# Patient Record
Sex: FEMALE | Race: WHITE | NOT HISPANIC OR LATINO | URBAN - NONMETROPOLITAN AREA
[De-identification: names, ages, dates, MRNs, and addresses within clinical notes are randomized per-mention and may not be internally consistent; named-entity substitution may affect disease eponyms.]

---

## 2022-08-25 ENCOUNTER — OFFICE VISIT (OUTPATIENT)
Dept: FAMILY MEDICINE CLINIC | Age: 52
End: 2022-08-25

## 2022-08-25 VITALS
RESPIRATION RATE: 16 BRPM | BODY MASS INDEX: 27.93 KG/M2 | HEART RATE: 66 BPM | WEIGHT: 163.6 LBS | HEIGHT: 64 IN | SYSTOLIC BLOOD PRESSURE: 122 MMHG | DIASTOLIC BLOOD PRESSURE: 66 MMHG | OXYGEN SATURATION: 98 % | TEMPERATURE: 98 F

## 2022-08-25 DIAGNOSIS — F41.1 GAD (GENERALIZED ANXIETY DISORDER): Primary | ICD-10-CM

## 2022-08-25 DIAGNOSIS — G47.09 OTHER INSOMNIA: ICD-10-CM

## 2022-08-25 DIAGNOSIS — F33.1 MAJOR DEPRESSIVE DISORDER, RECURRENT, MODERATE: ICD-10-CM

## 2022-08-25 PROCEDURE — 99214 OFFICE O/P EST MOD 30 MIN: CPT | Performed by: FAMILY MEDICINE

## 2022-08-25 RX ORDER — BENZONATATE 100 MG/1
100 CAPSULE ORAL 3 TIMES DAILY PRN
COMMUNITY

## 2022-08-25 RX ORDER — ALBUTEROL SULFATE 2.5 MG/3ML
2.5 SOLUTION RESPIRATORY (INHALATION) 3 TIMES DAILY PRN
COMMUNITY
End: 2022-09-22 | Stop reason: SDUPTHER

## 2022-08-25 RX ORDER — TRAZODONE HYDROCHLORIDE 50 MG/1
50 TABLET ORAL NIGHTLY
Qty: 30 TABLET | Refills: 0 | Status: SHIPPED | OUTPATIENT
Start: 2022-08-25 | End: 2022-09-22 | Stop reason: SDUPTHER

## 2022-08-25 RX ORDER — FLUOXETINE HYDROCHLORIDE 20 MG/1
20 CAPSULE ORAL DAILY
Qty: 90 CAPSULE | Refills: 0 | Status: SHIPPED | OUTPATIENT
Start: 2022-08-25 | End: 2022-09-22 | Stop reason: SDUPTHER

## 2022-08-25 RX ORDER — FLUOXETINE HYDROCHLORIDE 20 MG/1
1 CAPSULE ORAL DAILY
COMMUNITY
Start: 2022-06-10 | End: 2022-08-25 | Stop reason: SDUPTHER

## 2022-08-25 RX ORDER — MONTELUKAST SODIUM 10 MG/1
1 TABLET ORAL EVERY EVENING
COMMUNITY
Start: 2022-05-20 | End: 2022-09-22 | Stop reason: SDUPTHER

## 2022-08-25 RX ORDER — BUDESONIDE 0.5 MG/2ML
0.5 INHALANT ORAL DAILY PRN
COMMUNITY

## 2022-08-25 NOTE — PROGRESS NOTES
Teresa Martinez presents to [facility]      Chief Complaint  Follow up Anxiety, Depression    HPI   No side effects from Fluoxetine.  No panic attacks.  Not feeling anxious.  Feeling sad, quite teary and a little depressed.  No thoughts to hurt self.  Irritable.  No mood swings. No hallucinations.  Sleeping poorly. Difficult to obtain and maintain sleep  Appetite is good.  Socializing.      Current Outpatient Medications:   •  albuterol (PROVENTIL) (2.5 MG/3ML) 0.083% nebulizer solution, Take 2.5 mg by nebulization 3 (Three) Times a Day As Needed for Wheezing or Shortness of Air., Disp: , Rfl:   •  Beclomethasone Diprop HFA (QVAR Redihaler) 80 MCG/ACT inhaler, Inhale 1 puff 2 (Two) Times a Day., Disp: , Rfl:   •  benzonatate (TESSALON) 100 MG capsule, Take 100 mg by mouth 3 (Three) Times a Day As Needed for Cough., Disp: , Rfl:   •  budesonide (PULMICORT) 0.5 MG/2ML nebulizer solution, Take 0.5 mg by nebulization Daily As Needed (Shortness of Air; Wheezing)., Disp: , Rfl:   •  FLUoxetine (PROzac) 20 MG capsule, Take 1 capsule by mouth Daily., Disp: 90 capsule, Rfl: 0  •  montelukast (SINGULAIR) 10 MG tablet, Take 1 tablet by mouth Every Evening., Disp: , Rfl:   •  traZODone (DESYREL) 50 MG tablet, Take 1 tablet by mouth Every Night. Start with 1/2 tablet at bedtime.  If insomnia persists, after 3 days, may increase to 1 full tablet at bedtime, Disp: 30 tablet, Rfl: 0     Allergies   Allergen Reactions   • Buspirone Other (See Comments)     Not effective    • Codeine Other (See Comments)     Hyperactivity   • Prozac [Fluoxetine] Other (See Comments)     Depression if > 10mg   • Wellbutrin [Bupropion] Other (See Comments)     Depression    • Penicillins Rash        History reviewed. No pertinent past medical history.    History reviewed. No pertinent surgical history.     History reviewed. No pertinent family history.     Social History     Socioeconomic History   • Marital status: Unknown  "       ROS: As in HPI      Objective   Vital Signs:  /66 (BP Location: Left arm, Patient Position: Sitting, Cuff Size: Adult)   Pulse 66   Temp 98 °F (36.7 °C) (Infrared)   Resp 16   Ht 162.6 cm (64\")   Wt 74.2 kg (163 lb 9.6 oz)   SpO2 98%   BMI 28.08 kg/m²   Estimated body mass index is 28.08 kg/m² as calculated from the following:    Height as of this encounter: 162.6 cm (64\").    Weight as of this encounter: 74.2 kg (163 lb 9.6 oz).        Physical Exam  Constitutional:       Appearance: She is well-groomed.   Cardiovascular:      Rate and Rhythm: Normal rate and regular rhythm.   Pulmonary:      Comments: Clear to auscultation. Excellent air movement throughout  Skin:     General: Skin is warm and dry.      Coloration: Skin is not pale.      Findings: No erythema.   Neurological:      Comments: Within normal limits   Psychiatric:         Attention and Perception: Attention normal.         Mood and Affect: Mood is anxious and depressed.         Speech: Speech normal. Speech is not rapid and pressured.         Behavior: Behavior is cooperative.         Thought Content: Thought content normal.         Judgment: Judgment normal.      Comments: Pleasant. Fidgeting.  Excellent eye contact.          Result Review :                    Assessment and Plan   Diagnoses and all orders for this visit:    1. GERMAN (generalized anxiety disorder) (Primary)  -     FLUoxetine (PROzac) 20 MG capsule; Take 1 capsule by mouth Daily.  Dispense: 90 capsule; Refill: 0    2. Major depressive disorder, recurrent, moderate (HCC)  Assessment & Plan:  MDM:  Patient's depression is not in full control.  There has been an increase from last visit.  Insomnia is also an issue.  Will start Trazodone.  Medication discussed with patient.  She will take Fluoxetine in the morning as it did not help with sleep when she took it in the evening    Orders:  -     traZODone (DESYREL) 50 MG tablet; Take 1 tablet by mouth Every Night. Start with " 1/2 tablet at bedtime.  If insomnia persists, after 3 days, may increase to 1 full tablet at bedtime  Dispense: 30 tablet; Refill: 0    3. Other insomnia  -     traZODone (DESYREL) 50 MG tablet; Take 1 tablet by mouth Every Night. Start with 1/2 tablet at bedtime.  If insomnia persists, after 3 days, may increase to 1 full tablet at bedtime  Dispense: 30 tablet; Refill: 0           Follow Up   Return in about 3 weeks (around 9/15/2022) for F/U Anxiety/Depression.    Patient was given instructions and counseling regarding her condition or for health maintenance advice. Please see specific information pulled into the AVS if appropriate.   There are no Patient Instructions on file for this visit.

## 2022-08-25 NOTE — ASSESSMENT & PLAN NOTE
MDM:  Patient's depression is not in full control.  There has been an increase from last visit.  Insomnia is also an issue.  Will start Trazodone.  Medication discussed with patient.  She will take Fluoxetine in the morning as it did not help with sleep when she took it in the evening

## 2022-09-15 NOTE — PROGRESS NOTES
"Teresa Martinez presents to [facility]      Chief Complaint  Follow up Depression, Insomnia    HPI   \"I'm feeling much better.\"  No side effects from addition of Trazodone for inadequate control of depression and insomnia. Taking 1/2 tab at bedtime. Taking Fluoxetine in the mornings.  No panic attacks.  Less feeling of anxiety.  Anxiety form stress at work.  A lot of tasks to be done.  Not feeling sad, teary or depressed.  No thoughts to hurt self.  Not irritable.  No mood swings. No hallucinations.  Sleeping well now and waking refreshed.  Goes to bed at 11 PM, up at 7 AM.  Appetite is good.      COPD  Quit tobacco smoking.  History of COPD exacerabations throughout the fall and winter months.  Does well from April to October. \"Starting to feel mucous piling up in my lungs.\"    Current Outpatient Medications:   •  albuterol (PROVENTIL) (2.5 MG/3ML) 0.083% nebulizer solution, Take 2.5 mg by nebulization 3 (Three) Times a Day As Needed for Wheezing or Shortness of Air., Disp: 360 mL, Rfl: 1  •  Beclomethasone Diprop HFA (QVAR Redihaler) 80 MCG/ACT inhaler, Inhale 1 puff 2 (Two) Times a Day., Disp: , Rfl:   •  benzonatate (TESSALON) 100 MG capsule, Take 100 mg by mouth 3 (Three) Times a Day As Needed for Cough., Disp: , Rfl:   •  budesonide (PULMICORT) 0.5 MG/2ML nebulizer solution, Take 0.5 mg by nebulization Daily As Needed (Shortness of Air; Wheezing)., Disp: , Rfl:   •  FLUoxetine (PROzac) 20 MG capsule, Take 1 capsule by mouth Daily., Disp: 90 capsule, Rfl: 0  •  montelukast (SINGULAIR) 10 MG tablet, Take 1 tablet by mouth Every Evening., Disp: 90 tablet, Rfl: 1  •  traZODone (DESYREL) 50 MG tablet, Take 0.5 tablets by mouth Every Night., Disp: 45 tablet, Rfl: 0     Allergies   Allergen Reactions   • Buspirone Other (See Comments)     Not effective    • Codeine Other (See Comments)     Hyperactivity   • Prozac [Fluoxetine] Other (See Comments)     Depression if > 10mg   • Wellbutrin " "[Bupropion] Other (See Comments)     Depression    • Penicillins Rash        History reviewed. No pertinent past medical history.    History reviewed. No pertinent surgical history.     History reviewed. No pertinent family history.     Social History     Socioeconomic History   • Marital status: Unknown   Vaping Use   • Vaping Use: Never used   Substance and Sexual Activity   • Drug use: Never   • Sexual activity: Defer        ROS: As in HPI      Objective   Vital Signs:  /64 (BP Location: Left arm, Patient Position: Sitting, Cuff Size: Adult)   Pulse 67   Temp 98.2 °F (36.8 °C) (Infrared)   Resp 16   Ht 162.6 cm (64\")   Wt 74.8 kg (164 lb 12.8 oz)   SpO2 97%   BMI 28.29 kg/m²   Estimated body mass index is 28.29 kg/m² as calculated from the following:    Height as of this encounter: 162.6 cm (64\").    Weight as of this encounter: 74.8 kg (164 lb 12.8 oz).        Physical Exam  Constitutional:       Appearance: She is well-groomed.   Cardiovascular:      Rate and Rhythm: Normal rate and regular rhythm.   Pulmonary:      Comments: Clear to auscultation. Excellent air movement throughout  Skin:     General: Skin is warm and dry.      Coloration: Skin is not pale.      Findings: No erythema.   Neurological:      Comments: Within normal limits   Psychiatric:         Attention and Perception: Attention normal.         Mood and Affect: Mood and affect normal.         Speech: Speech normal. Speech is not rapid and pressured.         Behavior: Behavior is cooperative.         Thought Content: Thought content normal.         Judgment: Judgment normal.      Comments: Pleasant, relaxed.  Excellent eye contact         Result Review :                    Assessment and Plan   Diagnoses and all orders for this visit:    1. GERMAN (generalized anxiety disorder) (Primary)  Assessment & Plan:  MDM: Psychological condition well controlled. No change in medication    Orders:  -     FLUoxetine (PROzac) 20 MG capsule; Take 1 " capsule by mouth Daily.  Dispense: 90 capsule; Refill: 0    2. Obstructive chronic bronchitis without exacerbation (HCC)  Assessment & Plan:  MDM: Seasonal exacerbations of COPD.  Start preventive maintenance inhaler    Orders:  -     albuterol (PROVENTIL) (2.5 MG/3ML) 0.083% nebulizer solution; Take 2.5 mg by nebulization 3 (Three) Times a Day As Needed for Wheezing or Shortness of Air.  Dispense: 360 mL; Refill: 1    3. Other insomnia  -     traZODone (DESYREL) 50 MG tablet; Take 0.5 tablets by mouth Every Night.  Dispense: 45 tablet; Refill: 0    4. Non-seasonal allergic rhinitis due to pollen  Assessment & Plan:  MDM: Controlled with Singular.  Continue medication    Orders:  -     montelukast (SINGULAIR) 10 MG tablet; Take 1 tablet by mouth Every Evening.  Dispense: 90 tablet; Refill: 1    5. Major depressive disorder, recurrent, moderate (HCC)  Assessment & Plan:  MDM: Patient's depression and insomnia now controlled.  Continue 25 mg Trazodone at bedtime    Orders:  -     traZODone (DESYREL) 50 MG tablet; Take 0.5 tablets by mouth Every Night.  Dispense: 45 tablet; Refill: 0           Follow Up   Return in about 3 months (around 12/22/2022) for Depression/Anxiety, Insomnia.    Patient was given instructions and counseling regarding her condition or for health maintenance advice. Please see specific information pulled into the AVS if appropriate.   There are no Patient Instructions on file for this visit.

## 2022-09-22 ENCOUNTER — OFFICE VISIT (OUTPATIENT)
Dept: FAMILY MEDICINE CLINIC | Age: 52
End: 2022-09-22

## 2022-09-22 VITALS
OXYGEN SATURATION: 97 % | BODY MASS INDEX: 28.13 KG/M2 | DIASTOLIC BLOOD PRESSURE: 64 MMHG | HEART RATE: 67 BPM | RESPIRATION RATE: 16 BRPM | WEIGHT: 164.8 LBS | SYSTOLIC BLOOD PRESSURE: 112 MMHG | HEIGHT: 64 IN | TEMPERATURE: 98.2 F

## 2022-09-22 DIAGNOSIS — J30.1 NON-SEASONAL ALLERGIC RHINITIS DUE TO POLLEN: ICD-10-CM

## 2022-09-22 DIAGNOSIS — G47.09 OTHER INSOMNIA: ICD-10-CM

## 2022-09-22 DIAGNOSIS — F41.1 GAD (GENERALIZED ANXIETY DISORDER): Primary | ICD-10-CM

## 2022-09-22 DIAGNOSIS — F33.1 MAJOR DEPRESSIVE DISORDER, RECURRENT, MODERATE: ICD-10-CM

## 2022-09-22 DIAGNOSIS — Z12.31 SCREENING MAMMOGRAM FOR BREAST CANCER: ICD-10-CM

## 2022-09-22 DIAGNOSIS — J44.9 OBSTRUCTIVE CHRONIC BRONCHITIS WITHOUT EXACERBATION: ICD-10-CM

## 2022-09-22 PROBLEM — J44.89 OBSTRUCTIVE CHRONIC BRONCHITIS WITHOUT EXACERBATION: Status: ACTIVE | Noted: 2022-09-22

## 2022-09-22 PROCEDURE — 99214 OFFICE O/P EST MOD 30 MIN: CPT | Performed by: FAMILY MEDICINE

## 2022-09-22 RX ORDER — BUDESONIDE 0.5 MG/2ML
0.5 INHALANT ORAL DAILY PRN
Qty: 120 ML | Refills: 1 | Status: CANCELLED | OUTPATIENT
Start: 2022-09-22

## 2022-09-22 RX ORDER — TRAZODONE HYDROCHLORIDE 50 MG/1
25 TABLET ORAL NIGHTLY
Qty: 45 TABLET | Refills: 0 | Status: SHIPPED | OUTPATIENT
Start: 2022-09-22 | End: 2022-12-08 | Stop reason: SDUPTHER

## 2022-09-22 RX ORDER — FLUOXETINE HYDROCHLORIDE 20 MG/1
20 CAPSULE ORAL DAILY
Qty: 90 CAPSULE | Refills: 0 | Status: SHIPPED | OUTPATIENT
Start: 2022-09-22

## 2022-09-22 RX ORDER — ALBUTEROL SULFATE 2.5 MG/3ML
2.5 SOLUTION RESPIRATORY (INHALATION) 3 TIMES DAILY PRN
Qty: 360 ML | Refills: 1 | Status: SHIPPED | OUTPATIENT
Start: 2022-09-22

## 2022-09-22 RX ORDER — MONTELUKAST SODIUM 10 MG/1
10 TABLET ORAL EVERY EVENING
Qty: 90 TABLET | Refills: 1 | Status: SHIPPED | OUTPATIENT
Start: 2022-09-22

## 2022-12-08 DIAGNOSIS — G47.09 OTHER INSOMNIA: ICD-10-CM

## 2022-12-08 DIAGNOSIS — F33.1 MAJOR DEPRESSIVE DISORDER, RECURRENT, MODERATE: ICD-10-CM

## 2022-12-08 RX ORDER — TRAZODONE HYDROCHLORIDE 50 MG/1
25 TABLET ORAL NIGHTLY
Qty: 45 TABLET | Refills: 0 | Status: SHIPPED | OUTPATIENT
Start: 2022-12-08

## 2022-12-08 NOTE — TELEPHONE ENCOUNTER
Rx Refill Note      Requested Prescriptions     Pending Prescriptions Disp Refills   • traZODone (DESYREL) 50 MG tablet 45 tablet 0     Sig: Take 0.5 tablets by mouth Every Night.      Last office visit with prescribing clinician: 9/22/2022      Next office visit with prescribing clinician: 12/22/2022            Chante Spence MA  12/08/22, 14:26 EST

## 2022-12-19 DIAGNOSIS — Z12.31 SCREENING MAMMOGRAM FOR BREAST CANCER: ICD-10-CM

## 2023-11-15 ENCOUNTER — OFFICE VISIT (OUTPATIENT)
Dept: FAMILY MEDICINE CLINIC | Facility: CLINIC | Age: 53
End: 2023-11-15
Payer: MEDICAID

## 2023-11-15 VITALS
OXYGEN SATURATION: 96 % | TEMPERATURE: 97.7 F | SYSTOLIC BLOOD PRESSURE: 143 MMHG | RESPIRATION RATE: 17 BRPM | BODY MASS INDEX: 29.67 KG/M2 | HEART RATE: 79 BPM | HEIGHT: 64 IN | DIASTOLIC BLOOD PRESSURE: 90 MMHG | WEIGHT: 173.8 LBS

## 2023-11-15 DIAGNOSIS — J44.1 COPD WITH EXACERBATION: Primary | ICD-10-CM

## 2023-11-15 DIAGNOSIS — R05.1 ACUTE COUGH: ICD-10-CM

## 2023-11-15 PROCEDURE — 99214 OFFICE O/P EST MOD 30 MIN: CPT | Performed by: FAMILY MEDICINE

## 2023-11-15 PROCEDURE — T1015 CLINIC SERVICE: HCPCS | Performed by: FAMILY MEDICINE

## 2023-11-15 PROCEDURE — 1159F MED LIST DOCD IN RCRD: CPT | Performed by: FAMILY MEDICINE

## 2023-11-15 PROCEDURE — 1160F RVW MEDS BY RX/DR IN RCRD: CPT | Performed by: FAMILY MEDICINE

## 2023-11-15 RX ORDER — BUDESONIDE 0.5 MG/2ML
0.5 INHALANT ORAL
Qty: 60 ML | Refills: 0 | Status: SHIPPED | OUTPATIENT
Start: 2023-11-15

## 2023-11-15 RX ORDER — ALBUTEROL SULFATE 2.5 MG/3ML
2.5 SOLUTION RESPIRATORY (INHALATION) 3 TIMES DAILY PRN
Qty: 360 ML | Refills: 1 | Status: SHIPPED | OUTPATIENT
Start: 2023-11-15

## 2023-11-15 RX ORDER — AZITHROMYCIN 250 MG/1
TABLET, FILM COATED ORAL
Qty: 6 TABLET | Refills: 0 | Status: SHIPPED | OUTPATIENT
Start: 2023-11-15

## 2023-11-15 NOTE — PROGRESS NOTES
"Chief Complaint  Cough (X 2 weeks. Patient is now coughing blood x2 days. Believes she is having COPD flare up. Tried OTC Mucinex, Cold and Flu, Tylenol no relief. )    History of Present Illness   Ears:    Pain: no.  Drainage: no  Nose:  Epistaxis: no. Nasal drainage: yes. Nasal congestion: no. Post nasal drip: no.   Sneezing: no.  Sinusitis:  Facial pain: no.  Headache: no.   Throat:   Soreness: no.  Dysphagia.  Lungs:  Cough: yes, shortness of air and wheezing x 2 weeks.  Pleuritic pain: no.  Smoker: quit. Albuterol neb three times a day is not helping.  Also taking Mucinex  General:  Myalgias: no. Malaise: no.   Fever: no.  Chills: no.   Gastroenterology:  Diarrhea: no. Nausea: no. Vomiting: no        Objective     Vital Signs:   /90 (BP Location: Right arm, Patient Position: Sitting, Cuff Size: Adult)   Pulse 79   Temp 97.7 °F (36.5 °C) (Infrared)   Resp 17   Ht 162.6 cm (64\")   Wt 78.8 kg (173 lb 12.8 oz)   SpO2 96%    L/min   BMI 29.83 kg/m²   Current Outpatient Medications on File Prior to Visit   Medication Sig Dispense Refill    [DISCONTINUED] albuterol (PROVENTIL) (2.5 MG/3ML) 0.083% nebulizer solution Take 2.5 mg by nebulization 3 (Three) Times a Day As Needed for Wheezing or Shortness of Air. 360 mL 1    [DISCONTINUED] Beclomethasone Diprop HFA (QVAR Redihaler) 80 MCG/ACT inhaler Inhale 1 puff 2 (Two) Times a Day.      [DISCONTINUED] benzonatate (TESSALON) 100 MG capsule Take 1 capsule by mouth 3 (Three) Times a Day As Needed for Cough.      [DISCONTINUED] budesonide (PULMICORT) 0.5 MG/2ML nebulizer solution Take 0.5 mg by nebulization Daily As Needed (Shortness of Air; Wheezing). (Patient not taking: Reported on 11/15/2023)      [DISCONTINUED] FLUoxetine (PROzac) 20 MG capsule Take 1 capsule by mouth Daily. 90 capsule 0    [DISCONTINUED] montelukast (SINGULAIR) 10 MG tablet Take 1 tablet by mouth Every Evening. 90 tablet 1    [DISCONTINUED] traZODone (DESYREL) 50 MG tablet Take 0.5 " tablets by mouth Every Night. 45 tablet 0     No current facility-administered medications on file prior to visit.        Past Medical History:   Diagnosis Date    Anxiety     Caffeine use     COPD (chronic obstructive pulmonary disease) 2012    History of tobacco abuse     3PPD [id number]  1.5 PPD [id number]    TIA (transient ischemic attack) 2013    Vitamin D deficiency       Past Surgical History:   Procedure Laterality Date    ADENOIDECTOMY  1988    APPENDECTOMY       SECTION       SECTION       SECTION      COLON SURGERY      POST  COLON LACERATION REPAIR    ENDOMETRIAL ABLATION      TONSILLECTOMY      TUBAL ABDOMINAL LIGATION        Family History   Problem Relation Age of Onset    No Known Problems Mother     Suicidality Father     Ulcers Brother     No Known Problems Daughter     No Known Problems Son     Hypertension Son     Cervical cancer Half-Sister     Cervical cancer Half-Sister       Social History     Socioeconomic History    Marital status:    Tobacco Use    Smoking status: Former     Packs/day: 1.50     Years: 29.00     Additional pack years: 0.00     Total pack years: 43.50     Types: Cigarettes     Quit date:      Years since quittin.8    Smokeless tobacco: Never   Vaping Use    Vaping Use: Never used   Substance and Sexual Activity    Alcohol use: Never    Drug use: Never    Sexual activity: Defer         No visits with results within 3 Month(s) from this visit.   Latest known visit with results is:   No results found for any previous visit.         Physical Exam   General:  No acute distress,  good energy level, interactive, cooperative with exam.  Eyes:  Normal.  Ears:  Canals: normal  TM: intact, no erythema, air fluid level, bulging, dilated vessels  Nose:  Breathing comfortably through the nose. Scant clear mucous. Normal pink mucosa, 2+ edema bilaterally.  Sinuses:  Frontal: non-tender  Maxillary:  non-tender  Buccal Cavity:  Moist membranes  No oral lesions  Throat:  No mucous draining down the posterior oropharyngeal wall. No erythema. No exudate.  No laryngitis  Lungs:  Clear to auscultation bilaterally, good air movement throughout  with faint end expiratory wheeze at bases  Voice strong and clear  Cor:  Heart regular rate and rhythm without murmur   Abdomen:  Bowel sounds are normal pitch and frequency x 4 quadrants  No tenderness. No palpable mass.  Lymph nodes:  No enlarged anterior or posterior cervical lymph nodes  Neck:  Supple.  No palpable mass  Integument:  Warm, dry, pink without exanthema     Result Review  Data Reviewed:{ Labs  Result Review  Imaging  Med Tab  Media :23}                     Assessment and Plan {CC Problem List  Visit Diagnosis  ROS  Review (Popup)  Health Maintenance  Quality  BestPractice  Medications  SmartSets  SnapShot Encounters  Media :23}   Diagnoses and all orders for this visit:    1. COPD with exacerbation (Primary)  -     albuterol (PROVENTIL) (2.5 MG/3ML) 0.083% nebulizer solution; Take 2.5 mg by nebulization 3 (Three) Times a Day As Needed for Wheezing or Shortness of Air.  Dispense: 360 mL; Refill: 1  -     budesonide (Pulmicort) 0.5 MG/2ML nebulizer solution; Take 2 mL by nebulization Daily.  Dispense: 60 mL; Refill: 0    2. Acute cough  -     azithromycin (Zithromax) 250 MG tablet; Take 2 tabs now, then 1 tab daily for the next 4 days  Dispense: 6 tablet; Refill: 0          Follow Up {Instructions Charge Capture  Follow-up Communications :23}     Patient was given instructions and counseling regarding her condition or for health maintenance advice. Please see specific information pulled into the AVS (placed there by myself) if appropriate.    Return if symptoms worsen or fail to improve.    MDM   Acute exacerbation of COPD. Past recent history of tobacco smoking.  Start antibiotic and nebulized steroid    Iman Whitaker MD

## 2023-11-22 ENCOUNTER — TELEPHONE (OUTPATIENT)
Dept: FAMILY MEDICINE CLINIC | Facility: CLINIC | Age: 53
End: 2023-11-22
Payer: MEDICAID

## 2023-11-22 NOTE — TELEPHONE ENCOUNTER
Called and s/w patient regarding PA for Budesonide. Patient was informed that PA was approved for coverage starting on 11.21.2023 and ends on 11.20.2024. Advised patient to reach out to pharmacy and have the run again. Patient voiced understanding.